# Patient Record
Sex: MALE | Race: BLACK OR AFRICAN AMERICAN | NOT HISPANIC OR LATINO | Employment: OTHER | URBAN - METROPOLITAN AREA
[De-identification: names, ages, dates, MRNs, and addresses within clinical notes are randomized per-mention and may not be internally consistent; named-entity substitution may affect disease eponyms.]

---

## 2024-03-13 PROBLEM — I25.10 ATHEROSCLEROSIS OF CORONARY ARTERY WITHOUT ANGINA PECTORIS: Status: ACTIVE | Noted: 2024-03-13

## 2024-03-13 PROBLEM — G45.9 TIA (TRANSIENT ISCHEMIC ATTACK): Status: ACTIVE | Noted: 2024-03-13

## 2024-03-13 PROBLEM — I71.20 ANEURYSM OF THORACIC AORTA (CMS-HCC): Status: ACTIVE | Noted: 2024-03-13

## 2024-03-13 PROBLEM — I10 HYPERTENSION: Status: ACTIVE | Noted: 2024-03-13

## 2024-03-13 PROBLEM — G47.33 OBSTRUCTIVE SLEEP APNEA: Status: ACTIVE | Noted: 2024-03-13

## 2024-03-13 PROBLEM — E11.9 DIABETES (MULTI): Status: ACTIVE | Noted: 2024-03-13

## 2024-03-13 PROBLEM — Z98.61 HISTORY OF PTCA: Status: ACTIVE | Noted: 2024-03-13

## 2024-03-13 PROBLEM — E78.5 HYPERLIPIDEMIA: Status: ACTIVE | Noted: 2024-03-13

## 2024-03-13 PROBLEM — Z88.8 ALLERGY TO IODINE: Status: ACTIVE | Noted: 2024-03-13

## 2024-03-13 RX ORDER — GLUCOSAMINE HCL 500 MG
1 TABLET ORAL DAILY
COMMUNITY

## 2024-03-13 RX ORDER — METOPROLOL TARTRATE 25 MG/1
1 TABLET, FILM COATED ORAL 2 TIMES DAILY
COMMUNITY
Start: 2022-03-31 | End: 2024-03-18 | Stop reason: SDUPTHER

## 2024-03-13 RX ORDER — TAMSULOSIN HYDROCHLORIDE 0.4 MG/1
1 CAPSULE ORAL NIGHTLY
COMMUNITY

## 2024-03-13 RX ORDER — ATORVASTATIN CALCIUM 40 MG/1
1 TABLET, FILM COATED ORAL NIGHTLY
COMMUNITY
Start: 2021-11-24

## 2024-03-13 RX ORDER — ASPIRIN 81 MG/1
81 TABLET ORAL DAILY
COMMUNITY

## 2024-03-13 RX ORDER — METFORMIN HYDROCHLORIDE 500 MG/1
2 TABLET ORAL 2 TIMES DAILY
COMMUNITY

## 2024-03-13 RX ORDER — CYCLOBENZAPRINE HCL 10 MG
10 TABLET ORAL DAILY
COMMUNITY

## 2024-03-13 RX ORDER — VALSARTAN AND HYDROCHLOROTHIAZIDE 80; 12.5 MG/1; MG/1
1 TABLET, FILM COATED ORAL DAILY
COMMUNITY

## 2024-03-13 RX ORDER — PANTOPRAZOLE SODIUM 40 MG/1
1 TABLET, DELAYED RELEASE ORAL DAILY
COMMUNITY

## 2024-03-13 RX ORDER — GABAPENTIN 100 MG/1
1 CAPSULE ORAL 2 TIMES DAILY
COMMUNITY

## 2024-03-16 DIAGNOSIS — I10 PRIMARY HYPERTENSION: ICD-10-CM

## 2024-03-18 RX ORDER — METOPROLOL TARTRATE 25 MG/1
25 TABLET, FILM COATED ORAL 2 TIMES DAILY
Qty: 180 TABLET | Refills: 3 | Status: SHIPPED | OUTPATIENT
Start: 2024-03-18

## 2024-06-28 DIAGNOSIS — I10 HYPERTENSION, UNSPECIFIED TYPE: ICD-10-CM

## 2024-07-01 RX ORDER — VALSARTAN AND HYDROCHLOROTHIAZIDE 80; 12.5 MG/1; MG/1
1 TABLET, FILM COATED ORAL DAILY
Qty: 90 TABLET | Refills: 3 | Status: SHIPPED | OUTPATIENT
Start: 2024-07-01 | End: 2025-07-01

## 2024-07-24 ENCOUNTER — TELEPHONE (OUTPATIENT)
Dept: CARDIOLOGY | Facility: CLINIC | Age: 79
End: 2024-07-24

## 2024-07-24 ENCOUNTER — APPOINTMENT (OUTPATIENT)
Dept: CARDIOLOGY | Facility: CLINIC | Age: 79
End: 2024-07-24
Payer: MEDICARE

## 2024-07-24 VITALS
WEIGHT: 226.2 LBS | HEIGHT: 75 IN | BODY MASS INDEX: 28.12 KG/M2 | DIASTOLIC BLOOD PRESSURE: 76 MMHG | SYSTOLIC BLOOD PRESSURE: 122 MMHG | HEART RATE: 76 BPM

## 2024-07-24 DIAGNOSIS — E11.69 TYPE 2 DIABETES MELLITUS WITH OTHER SPECIFIED COMPLICATION, WITHOUT LONG-TERM CURRENT USE OF INSULIN (MULTI): ICD-10-CM

## 2024-07-24 DIAGNOSIS — I71.20 THORACIC AORTIC ANEURYSM (TAA), UNSPECIFIED PART, UNSPECIFIED WHETHER RUPTURED (CMS-HCC): ICD-10-CM

## 2024-07-24 DIAGNOSIS — Z98.61 HISTORY OF PTCA: ICD-10-CM

## 2024-07-24 DIAGNOSIS — I25.10 ATHEROSCLEROSIS OF CORONARY ARTERY WITHOUT ANGINA PECTORIS, UNSPECIFIED VESSEL OR LESION TYPE, UNSPECIFIED WHETHER NATIVE OR TRANSPLANTED HEART: ICD-10-CM

## 2024-07-24 DIAGNOSIS — E78.2 MIXED HYPERLIPIDEMIA: ICD-10-CM

## 2024-07-24 DIAGNOSIS — Z88.8 ALLERGY TO IODINE: ICD-10-CM

## 2024-07-24 PROCEDURE — 3078F DIAST BP <80 MM HG: CPT | Performed by: INTERNAL MEDICINE

## 2024-07-24 PROCEDURE — 1159F MED LIST DOCD IN RCRD: CPT | Performed by: INTERNAL MEDICINE

## 2024-07-24 PROCEDURE — 1160F RVW MEDS BY RX/DR IN RCRD: CPT | Performed by: INTERNAL MEDICINE

## 2024-07-24 PROCEDURE — 99214 OFFICE O/P EST MOD 30 MIN: CPT | Performed by: INTERNAL MEDICINE

## 2024-07-24 PROCEDURE — 3074F SYST BP LT 130 MM HG: CPT | Performed by: INTERNAL MEDICINE

## 2024-07-24 NOTE — PROGRESS NOTES
"Subjective   Mahendra Handley is a 78 y.o. male       Chief Complaint    Annual Exam          78-year-old gentleman returns for annual follow-up he is doing well he denies any cardiovascular events, complaints or nitrate usage or hospitalizations.  He did not follow through with CT imaging of the ascending aorta.    He has a history of remote PCI with bare-metal stent in 2008, diabetes mellitus, hypertension. He had normal stress imaging in 2020.     He has known history of ascending aortic dilation/aneurysmal disease has been followed for years and has been stable last CTA of the ascending aorta was 2 year ago and remains stable at 4.7 cm.    Recommendations, obtain lipid panel, obtain CT ascending aorta, with appropriate premedication for contrast allergy, follow-up otherwise in 1 year           Review of Systems   All other systems reviewed and are negative.           Vitals:    07/24/24 1206   BP: 122/76   BP Location: Left arm   Patient Position: Sitting   Pulse: 76   Weight: 103 kg (226 lb 3.2 oz)   Height: 1.905 m (6' 3\")        Objective   Physical Exam  Constitutional:       Appearance: Normal appearance.   HENT:      Nose: Nose normal.   Neck:      Vascular: No carotid bruit.   Cardiovascular:      Rate and Rhythm: Normal rate.      Pulses: Normal pulses.      Heart sounds: Normal heart sounds.   Pulmonary:      Effort: Pulmonary effort is normal.   Abdominal:      General: Bowel sounds are normal.      Palpations: Abdomen is soft.   Musculoskeletal:         General: Normal range of motion.      Cervical back: Normal range of motion.      Right lower leg: No edema.      Left lower leg: No edema.   Skin:     General: Skin is warm and dry.   Neurological:      General: No focal deficit present.      Mental Status: He is alert.   Psychiatric:         Mood and Affect: Mood normal.         Behavior: Behavior normal.         Thought Content: Thought content normal.         Judgment: Judgment normal. "         Allergies  Iodinated contrast media     Current Medications    Current Outpatient Medications:     aspirin 81 mg EC tablet, Take 1 tablet (81 mg) by mouth once daily., Disp: , Rfl:     atorvastatin (Lipitor) 40 mg tablet, Take 1 tablet (40 mg) by mouth once daily at bedtime., Disp: , Rfl:     cyclobenzaprine (Flexeril) 10 mg tablet, Take 1 tablet (10 mg) by mouth once daily., Disp: , Rfl:     gabapentin (Neurontin) 100 mg capsule, Take 1 capsule (100 mg) by mouth 2 times a day., Disp: , Rfl:     metFORMIN (Glucophage) 500 mg tablet, Take 2 tablets (1,000 mg) by mouth 2 times a day., Disp: , Rfl:     metoprolol tartrate (Lopressor) 25 mg tablet, TAKE 1 TABLET BY MOUTH TWICE  DAILY, Disp: 180 tablet, Rfl: 3    pantoprazole (ProtoNix) 40 mg EC tablet, Take 1 tablet (40 mg) by mouth once daily., Disp: , Rfl:     tamsulosin (Flomax) 0.4 mg 24 hr capsule, Take 1 capsule (0.4 mg) by mouth once daily at bedtime., Disp: , Rfl:     ubidecarenone (coenzyme Q10) 100 mg tablet, Take 1 tablet by mouth once daily., Disp: , Rfl:     valsartan-hydrochlorothiazide (Diovan-HCT) 80-12.5 mg tablet, Take 1 tablet by mouth once daily., Disp: 90 tablet, Rfl: 3                     Assessment/Plan   1. Atherosclerosis of coronary artery without angina pectoris, unspecified vessel or lesion type, unspecified whether native or transplanted heart        2. History of PTCA        3. Thoracic aortic aneurysm (TAA), unspecified part, unspecified whether ruptured (CMS-HCC)        4. Type 2 diabetes mellitus with other specified complication, without long-term current use of insulin (Multi)        5. BMI 28.0-28.9,adult        6. Mixed hyperlipidemia                 Scribe Attestation  By signing my name below, I, Radha REYNA LPN  , Scribe   attest that this documentation has been prepared under the direction and in the presence of Reddy Macedo DO.     Provider Attestation - Scribe documentation    All medical record entries made by the  Scribe were at my direction and personally dictated by me. I have reviewed the chart and agree that the record accurately reflects my personal performance of the history, physical exam, discussion and plan.

## 2024-07-24 NOTE — PATIENT INSTRUCTIONS
Please bring all medicines, vitamins, and herbal supplements with you when you come to the office.    Prescriptions will not be filled unless you are compliant with your follow up appointments or have a follow up appointment scheduled as per instruction of your physician. Refills should be requested at the time of your visit.   BMI was above normal measurement. Current weight: 103 kg (226 lb 3.2 oz)  Weight change since last visit (-) denotes wt loss -8.8 lbs   Weight loss needed to achieve BMI 25: 26.6 Lbs  Weight loss needed to achieve BMI 30: -13.3 Lbs  Advised to Increase physical activity.

## 2024-07-24 NOTE — LETTER
"July 24, 2024     Edvin Salamanca DO  [address]  [facility] [address]    Patient: Mahendra Handley   YOB: 1945   Date of Visit: 7/24/2024       Dear Dr. Edvin Salamanca, :    Thank you for referring Mahendra Handley to me for evaluation. Below are my notes for this consultation.  If you have questions, please do not hesitate to call me. I look forward to following your patient along with you.       Sincerely,     Reddy Macedo DO      CC: No Recipients  ______________________________________________________________________________________    Subjective   Mahendra Handley is a 78 y.o. male       Chief Complaint    Annual Exam          78-year-old gentleman returns for annual follow-up he is doing well he denies any cardiovascular events, complaints or nitrate usage or hospitalizations.  He did not follow through with CT imaging of the ascending aorta.    He has a history of remote PCI with bare-metal stent in 2008, diabetes mellitus, hypertension. He had normal stress imaging in 2020.     He has known history of ascending aortic dilation/aneurysmal disease has been followed for years and has been stable last CTA of the ascending aorta was 2 year ago and remains stable at 4.7 cm.    Recommendations, obtain lipid panel, obtain CT ascending aorta, with appropriate premedication for contrast allergy, follow-up otherwise in 1 year           Review of Systems   All other systems reviewed and are negative.           Vitals:    07/24/24 1206   BP: 122/76   BP Location: Left arm   Patient Position: Sitting   Pulse: 76   Weight: 103 kg (226 lb 3.2 oz)   Height: 1.905 m (6' 3\")        Objective   Physical Exam  Constitutional:       Appearance: Normal appearance.   HENT:      Nose: Nose normal.   Neck:      Vascular: No carotid bruit.   Cardiovascular:      Rate and Rhythm: Normal rate.      Pulses: Normal pulses.      Heart sounds: Normal heart sounds.   Pulmonary:      Effort: Pulmonary effort is " normal.   Abdominal:      General: Bowel sounds are normal.      Palpations: Abdomen is soft.   Musculoskeletal:         General: Normal range of motion.      Cervical back: Normal range of motion.      Right lower leg: No edema.      Left lower leg: No edema.   Skin:     General: Skin is warm and dry.   Neurological:      General: No focal deficit present.      Mental Status: He is alert.   Psychiatric:         Mood and Affect: Mood normal.         Behavior: Behavior normal.         Thought Content: Thought content normal.         Judgment: Judgment normal.         Allergies  Iodinated contrast media     Current Medications    Current Outpatient Medications:   •  aspirin 81 mg EC tablet, Take 1 tablet (81 mg) by mouth once daily., Disp: , Rfl:   •  atorvastatin (Lipitor) 40 mg tablet, Take 1 tablet (40 mg) by mouth once daily at bedtime., Disp: , Rfl:   •  cyclobenzaprine (Flexeril) 10 mg tablet, Take 1 tablet (10 mg) by mouth once daily., Disp: , Rfl:   •  gabapentin (Neurontin) 100 mg capsule, Take 1 capsule (100 mg) by mouth 2 times a day., Disp: , Rfl:   •  metFORMIN (Glucophage) 500 mg tablet, Take 2 tablets (1,000 mg) by mouth 2 times a day., Disp: , Rfl:   •  metoprolol tartrate (Lopressor) 25 mg tablet, TAKE 1 TABLET BY MOUTH TWICE  DAILY, Disp: 180 tablet, Rfl: 3  •  pantoprazole (ProtoNix) 40 mg EC tablet, Take 1 tablet (40 mg) by mouth once daily., Disp: , Rfl:   •  tamsulosin (Flomax) 0.4 mg 24 hr capsule, Take 1 capsule (0.4 mg) by mouth once daily at bedtime., Disp: , Rfl:   •  ubidecarenone (coenzyme Q10) 100 mg tablet, Take 1 tablet by mouth once daily., Disp: , Rfl:   •  valsartan-hydrochlorothiazide (Diovan-HCT) 80-12.5 mg tablet, Take 1 tablet by mouth once daily., Disp: 90 tablet, Rfl: 3                     Assessment/Plan   1. Atherosclerosis of coronary artery without angina pectoris, unspecified vessel or lesion type, unspecified whether native or transplanted heart        2. History of PTCA         3. Thoracic aortic aneurysm (TAA), unspecified part, unspecified whether ruptured (CMS-HCC)        4. Type 2 diabetes mellitus with other specified complication, without long-term current use of insulin (Multi)        5. BMI 28.0-28.9,adult        6. Mixed hyperlipidemia                 Scribe Attestation  By signing my name below, IRadha LPN  , Scribe   attest that this documentation has been prepared under the direction and in the presence of Reddy Macedo DO.     Provider Attestation - Scribe documentation    All medical record entries made by the Scribe were at my direction and personally dictated by me. I have reviewed the chart and agree that the record accurately reflects my personal performance of the history, physical exam, discussion and plan.

## 2024-07-25 RX ORDER — PREDNISONE 20 MG/1
20 TABLET ORAL 3 TIMES DAILY
Qty: 6 TABLET | Refills: 0 | Status: SHIPPED | OUTPATIENT
Start: 2024-07-25 | End: 2024-07-27

## 2024-07-25 RX ORDER — FAMOTIDINE 40 MG/1
40 TABLET, FILM COATED ORAL DAILY
Qty: 2 TABLET | Refills: 0 | Status: SHIPPED | OUTPATIENT
Start: 2024-07-25 | End: 2024-07-27

## 2024-07-25 RX ORDER — DIPHENHYDRAMINE HCL 25 MG
25 CAPSULE ORAL 3 TIMES DAILY
Qty: 6 CAPSULE | Refills: 0 | Status: SHIPPED | OUTPATIENT
Start: 2024-07-25 | End: 2024-07-27

## 2024-07-29 LAB
NON-UH HIE ISTAT CREATININE LEVEL: 2.3 MG/DL (ref 0.6–1.3)
NON-UH HIE ISTAT GFR: 28.36

## 2024-07-30 ENCOUNTER — TELEPHONE (OUTPATIENT)
Dept: CARDIOLOGY | Facility: CLINIC | Age: 79
End: 2024-07-30
Payer: MEDICARE

## 2024-07-30 NOTE — TELEPHONE ENCOUNTER
Spoke with patient who verbalized understanding of ct results. Will call office with any new symptoms or any changes

## 2024-07-30 NOTE — TELEPHONE ENCOUNTER
----- Message from Reddy Macedo sent at 7/29/2024  5:37 PM EDT -----  Let patient know ascending aorta is unchanged from the past at approximately 4.7 cm; continue to follow conservatively

## 2024-08-18 DIAGNOSIS — E78.5 HYPERLIPIDEMIA, UNSPECIFIED HYPERLIPIDEMIA TYPE: ICD-10-CM

## 2024-08-19 RX ORDER — ATORVASTATIN CALCIUM 40 MG/1
40 TABLET, FILM COATED ORAL NIGHTLY
Qty: 90 TABLET | Refills: 3 | Status: SHIPPED | OUTPATIENT
Start: 2024-08-19 | End: 2025-08-19

## 2024-10-28 ENCOUNTER — TELEPHONE (OUTPATIENT)
Dept: GASTROENTEROLOGY | Facility: CLINIC | Age: 79
End: 2024-10-28
Payer: MEDICARE

## 2024-12-03 ENCOUNTER — TELEPHONE (OUTPATIENT)
Dept: GASTROENTEROLOGY | Facility: CLINIC | Age: 79
End: 2024-12-03
Payer: MEDICARE

## 2024-12-03 NOTE — TELEPHONE ENCOUNTER
[facility] called regarding referral, please call to schedule with Dr. ARTURO Smart.

## 2024-12-13 ENCOUNTER — TELEPHONE (OUTPATIENT)
Dept: GASTROENTEROLOGY | Facility: CLINIC | Age: 79
End: 2024-12-13
Payer: MEDICARE

## 2024-12-13 NOTE — TELEPHONE ENCOUNTER
Rosalinda from [facility] called to see if Mahendra had a appointment scheduled yet, which I did not see he has or had appointment.     I gave her QIANA Loza's direct line and she has the [facility] fax number for Irene.

## 2025-01-02 ENCOUNTER — DOCUMENTATION (OUTPATIENT)
Dept: GASTROENTEROLOGY | Facility: HOSPITAL | Age: 80
End: 2025-01-02
Payer: MEDICARE

## 2025-01-02 NOTE — PROGRESS NOTES
Dr. Saranya Smart's office received a referral for this patient from Dr. Hugo Laird for abnormal levels of other serum enzymes, acute pancreatitis without necrosis or infection, unspecified. Dr. Smart reviewed the referral on 12/29/2024. Per Dr. Smart, OK to schedule a clinic consultation.    Irene Loza was notified to call and schedule this patient. Contact Lanette Yip RN at [phone] for any questions.      See below for supporting clinical information from the referral sent by Dr. Laird's office and from medical records:    Diagnosis Code:  ·       Abnormal levels of other serum enzymes  ·       Acute pancreatitis without necrosis or infection, unspecified.     Elevated amylase and lipase  Has a past medical history of CAD, DM 2, GERD, sleep apea and HTN  Stage 3b chronic kidney disease  CT A/P 9/27/2024: no CT findings of pancreatitis.     Labs from 10/16/2024:  ·       Bilirubin, Total: 0.7  ·       Alkaline Phosphatase: 61  ·       AST: 30  ·       ALT: 23  ·       Lipase: 47  ·       Amylase: 138 H        [facility]  Outside Information  Results  CT abdomen pelvis wo IV contrast (Order [id number])     CT abdomen pelvis wo IV contrast  Order: [id number]  Impression        No CT findings of pancreatitis.     Hepatic steatosis.              ELECTRONICALLY SIGNED BY: Ha Smith DO  Narrative        EXAM: CT ABDOMEN WO IV CONTRAST        History: elevation of pancreatic enzymes     Technique: Multiple contiguous axial images were obtained of the abdomen and pelvis from the level of the lung bases through the iliac crests without contrast. Multiplanar reformats were obtained.     All CT scans at this facility use dose modulation, iterative reconstruction, and/or weight based dosing when appropriate to reduce radiation dose to as low as reasonably achievable.        Comparison: None available     Findings:     Lung bases are clear.     Lack of intravenous contrast precludes optimal  evaluation of the abdominal and pelvic viscera. Hypoattenuation of the liver. Postsurgical changes of cholecystectomy. The spleen, stomach, and adrenal glands appear within normal limits. Normal appearance of the pancreas other than a few tiny pancreatic calcifications that can be seen in the setting of sequela of chronic pancreatitis. No peripancreatic edema or fluid collection. No pancreatic duct dilation.     The unenhanced kidneys appear within normal limits. No renal calculi or hydronephrosis.     Abdominal aorta is nonaneurysmal. Mild atherosclerotic calcification of the abdominal aorta. No retroperitoneal or upper abdominal lymphadenopathy.     Visualized bowel appears within normal limits. No free fluid or free air.     No acute osseous abnormality. Degenerative changes of the spine.  Exam End: 09/27/24 12:33      Specimen Collected: 09/27/24 13:45 Last Resulted: 09/27/24 13:49  Received From: [facility]

## 2025-01-27 ENCOUNTER — APPOINTMENT (OUTPATIENT)
Dept: GASTROENTEROLOGY | Facility: CLINIC | Age: 80
End: 2025-01-27
Payer: MEDICARE

## 2025-01-27 VITALS
RESPIRATION RATE: 18 BRPM | DIASTOLIC BLOOD PRESSURE: 87 MMHG | WEIGHT: 226 LBS | BODY MASS INDEX: 28.1 KG/M2 | HEIGHT: 75 IN | OXYGEN SATURATION: 98 % | SYSTOLIC BLOOD PRESSURE: 147 MMHG | HEART RATE: 63 BPM

## 2025-01-27 DIAGNOSIS — K86.1 CHRONIC PANCREATITIS, UNSPECIFIED PANCREATITIS TYPE (MULTI): Primary | ICD-10-CM

## 2025-01-27 DIAGNOSIS — K86.89 PANCREATIC INSUFFICIENCY (HHS-HCC): ICD-10-CM

## 2025-01-27 DIAGNOSIS — Z88.8 ALLERGY TO IODINE: ICD-10-CM

## 2025-01-27 DIAGNOSIS — R74.8 ELEVATED AMYLASE: ICD-10-CM

## 2025-01-27 PROCEDURE — 3077F SYST BP >= 140 MM HG: CPT | Performed by: INTERNAL MEDICINE

## 2025-01-27 PROCEDURE — 3079F DIAST BP 80-89 MM HG: CPT | Performed by: INTERNAL MEDICINE

## 2025-01-27 PROCEDURE — 1159F MED LIST DOCD IN RCRD: CPT | Performed by: INTERNAL MEDICINE

## 2025-01-27 PROCEDURE — 99204 OFFICE O/P NEW MOD 45 MIN: CPT | Performed by: INTERNAL MEDICINE

## 2025-01-27 NOTE — PROGRESS NOTES
REASON FOR VISIT: Elevated amylase, chronic pancreatitis seen on recent CT scan, history of allergy to iodine contrast    HPI:  Mahendra Handley is a 79 y.o. male who presents for above issues.  He does not have any complaints, review of system negative. Past medical history of coronary artery disease, diabetes type 2 GERD sleep apnea hypertension.  Status post cholecystectomy 20 years ago.  Had mild elevation of amylase at 149 on October 2024.  Had workup including CT scan of the abdomen without on September 2024 showed: Suboptimal evaluation of the abdomen and pelvis viscera due to lack of contrast.  Hypoattenuation of the liver.  Postsurgical changes of cholecystectomy.  The spleen, stomach, adrenal gland appear within normal limit.  Normal appearance of the pancreas other than a few tiny pancreatic calcification that can be seen in the setting of sequela of chronic pancreatitis.  No peripancreatic edema fluid collection.  No pancreatic duct dilation.  He denies, smoking, drinking or using any drugs.  No personal or family history of GI cancer or pancreatic disease.  His labs reviewed    REVIEW OF SYSTEMS  Review of Systems   Constitutional: Negative.  Negative for activity change, appetite change, chills, fatigue, fever and unexpected weight change.   HENT: Negative.     Respiratory: Negative.     Cardiovascular: Negative.  Negative for chest pain and palpitations.   Gastrointestinal: Negative.  Negative for abdominal distention, abdominal pain, anal bleeding, blood in stool, constipation, diarrhea, nausea, rectal pain and vomiting.   Skin: Negative.  Negative for color change and rash.   Neurological: Negative.  Negative for dizziness, tremors, seizures, weakness and headaches.   Psychiatric/Behavioral: Negative.  Negative for confusion.       Allergies   Allergen Reactions    Iodinated Contrast Media Unknown       No past medical history on file.    Past Surgical History:   Procedure Laterality Date    OTHER  SURGICAL HISTORY  03/25/2022    Cholecystectomy    OTHER SURGICAL HISTORY  03/25/2022    Back surgery    OTHER SURGICAL HISTORY  03/25/2022    Cataract surgery    OTHER SURGICAL HISTORY  03/25/2022    Colonoscopy    OTHER SURGICAL HISTORY  03/25/2022    Knee surgery    OTHER SURGICAL HISTORY  03/25/2022    Achilles tendon surgery    OTHER SURGICAL HISTORY  03/25/2022    Percutaneous transluminal coronary angioplasty       Family History   Problem Relation Name Age of Onset    No Known Problems Mother      No Known Problems Father      No Known Problems Sister         Social History     Tobacco Use    Smoking status: Never    Smokeless tobacco: Never   Substance Use Topics    Alcohol use: Never       Current Outpatient Medications   Medication Sig Dispense Refill    aspirin 81 mg EC tablet Take 1 tablet (81 mg) by mouth once daily.      atorvastatin (Lipitor) 40 mg tablet Take 1 tablet (40 mg) by mouth once daily at bedtime. 90 tablet 3    cyclobenzaprine (Flexeril) 10 mg tablet Take 1 tablet (10 mg) by mouth once daily.      gabapentin (Neurontin) 100 mg capsule Take 1 capsule (100 mg) by mouth 2 times a day.      metFORMIN (Glucophage) 500 mg tablet Take 2 tablets (1,000 mg) by mouth 2 times a day.      metoprolol tartrate (Lopressor) 25 mg tablet TAKE 1 TABLET BY MOUTH TWICE  DAILY 180 tablet 3    tamsulosin (Flomax) 0.4 mg 24 hr capsule Take 1 capsule (0.4 mg) by mouth once daily at bedtime.      ubidecarenone (coenzyme Q10) 100 mg tablet Take 1 tablet by mouth once daily.      valsartan-hydrochlorothiazide (Diovan-HCT) 80-12.5 mg tablet Take 1 tablet by mouth once daily. 90 tablet 3    diphenhydrAMINE (BENADryl) 25 mg capsule Take 1 capsule (25 mg) by mouth 3 times a day for 6 doses. Take one tablet 3 times daily for 2 days with last dose AM of procedure 6 capsule 0    famotidine (Pepcid) 40 mg tablet Take 1 tablet (40 mg) by mouth once daily for 2 days. Take 1 tablet daily for 2 days with last dose AM of  "procedure 2 tablet 0    pantoprazole (ProtoNix) 40 mg EC tablet Take 1 tablet (40 mg) by mouth once daily. (Patient not taking: Reported on 1/27/2025)       No current facility-administered medications for this visit.       PHYSICAL EXAM:  /87   Pulse 63   Resp 18   Ht 1.905 m (6' 3\")   Wt 103 kg (226 lb)   SpO2 98%   BMI 28.25 kg/m²    General appearance: No acute distress, cooperative.  Eyes: Nonicteric, no redness or proptosis  Ears, nose, mouth, and throat: Moist mucous membranes, tongue normal  Neck: Supple, no lymphadenopathy or thyromegaly  Lungs: Clear to auscultation bilaterally  CV: Regular rate and rhythm, no murmur; no pitting edema in the lower extremities  Abd: soft, non-tender; non-distended; normal active bowel sounds; no  scars  Skin: No rashes or lesions; no liver stigmata  MSK: No deformities or joint edema/redness/tenderness  Neuro: Alert and oriented ×3, normal gait, non-focal no asterixis      No results found for: \"WBC\", \"HGB\", \"HCT\", \"MCV\", \"PLT\"    Lab Results   Component Value Date    ALT 21 01/27/2025    AST 23 01/27/2025    ALKPHOS 56 01/27/2025    BILITOT 0.7 01/27/2025     No results found for: \"INR\", \"PROTIME\"    No results found for: \"IRON\", \"TIBC\", \"FERRITIN\"     ASSESSMENT&PLAN:  Mild elevation of amylase,?  Chronic pancreatitis without pancreatic exocrine insufficiency.  Patient does not have any alarming symptoms.  Will repeat amylase and lipase, triglyceride level, IgG4 and alpha-1 antitrypsin  Will order stool study including stool pancreatic elastase and fat qualitative to rule out exocrine pancreatic insufficiency  Will order MRI MRCP of the pancreas with iodine allergy protocol  Will follow-up with the patient after the workup      Consultation requested by Dr. Edvin Salamanca DO.   My final recommendations will be communicated back to the requesting physician by way of shared Medical record or letter to requesting physician via fax.          Signature: Saranya " MD Bing    Date: 1/27/2025  Time: 10:43 AM

## 2025-01-30 LAB
A1AT SERPL-MCNC: 122 MG/DL (ref 83–199)
ALBUMIN SERPL-MCNC: 4.3 G/DL (ref 3.6–5.1)
ALP SERPL-CCNC: 56 U/L (ref 35–144)
ALT SERPL-CCNC: 21 U/L (ref 9–46)
AMYLASE SERPL-CCNC: 92 U/L (ref 21–101)
ANION GAP SERPL CALCULATED.4IONS-SCNC: 11 MMOL/L (CALC) (ref 7–17)
AST SERPL-CCNC: 23 U/L (ref 10–35)
BILIRUB SERPL-MCNC: 0.7 MG/DL (ref 0.2–1.2)
BUN SERPL-MCNC: 27 MG/DL (ref 7–25)
CALCIUM SERPL-MCNC: 9.7 MG/DL (ref 8.6–10.3)
CHLORIDE SERPL-SCNC: 105 MMOL/L (ref 98–110)
CO2 SERPL-SCNC: 23 MMOL/L (ref 20–32)
CREAT SERPL-MCNC: 1.81 MG/DL (ref 0.7–1.28)
EGFRCR SERPLBLD CKD-EPI 2021: 38 ML/MIN/1.73M2
GLUCOSE SERPL-MCNC: 125 MG/DL (ref 65–139)
IGG4 SER-MCNC: 14.1 MG/DL (ref 4–86)
LIPASE SERPL-CCNC: 54 U/L (ref 7–60)
POTASSIUM SERPL-SCNC: 4.3 MMOL/L (ref 3.5–5.3)
PROT SERPL-MCNC: 6.3 G/DL (ref 6.1–8.1)
SODIUM SERPL-SCNC: 139 MMOL/L (ref 135–146)
TRIGL SERPL-MCNC: 279 MG/DL

## 2025-02-10 DIAGNOSIS — Z88.8 ALLERGY TO IODINE COMPOUND: ICD-10-CM

## 2025-02-11 RX ORDER — PREDNISONE 50 MG/1
50 TABLET ORAL DAILY
Qty: 3 TABLET | Refills: 0 | Status: SHIPPED | OUTPATIENT
Start: 2025-02-11 | End: 2025-02-14

## 2025-02-14 ENCOUNTER — HOSPITAL ENCOUNTER (OUTPATIENT)
Dept: RADIOLOGY | Facility: HOSPITAL | Age: 80
Discharge: HOME | End: 2025-02-14
Payer: MEDICARE

## 2025-02-14 DIAGNOSIS — R74.8 ELEVATED AMYLASE: ICD-10-CM

## 2025-02-14 DIAGNOSIS — K86.1 CHRONIC PANCREATITIS, UNSPECIFIED PANCREATITIS TYPE (MULTI): ICD-10-CM

## 2025-02-14 DIAGNOSIS — K86.89 PANCREATIC INSUFFICIENCY (HHS-HCC): ICD-10-CM

## 2025-02-14 PROCEDURE — 74183 MRI ABD W/O CNTR FLWD CNTR: CPT

## 2025-02-14 PROCEDURE — A9575 INJ GADOTERATE MEGLUMI 0.1ML: HCPCS | Performed by: INTERNAL MEDICINE

## 2025-02-14 PROCEDURE — 2550000001 HC RX 255 CONTRASTS: Performed by: INTERNAL MEDICINE

## 2025-02-14 RX ORDER — GADOTERATE MEGLUMINE 376.9 MG/ML
0.2 INJECTION INTRAVENOUS
Status: COMPLETED | OUTPATIENT
Start: 2025-02-14 | End: 2025-02-14

## 2025-02-14 RX ADMIN — GADOTERATE MEGLUMINE 20 ML: 376.9 INJECTION INTRAVENOUS at 14:15

## 2025-07-24 ENCOUNTER — APPOINTMENT (OUTPATIENT)
Dept: CARDIOLOGY | Facility: CLINIC | Age: 80
End: 2025-07-24
Payer: MEDICARE

## 2025-07-24 VITALS
HEART RATE: 60 BPM | WEIGHT: 223.4 LBS | BODY MASS INDEX: 27.78 KG/M2 | SYSTOLIC BLOOD PRESSURE: 118 MMHG | DIASTOLIC BLOOD PRESSURE: 68 MMHG | HEIGHT: 75 IN

## 2025-07-24 DIAGNOSIS — Z98.61 HISTORY OF PTCA: ICD-10-CM

## 2025-07-24 DIAGNOSIS — I25.10 ATHEROSCLEROSIS OF CORONARY ARTERY WITHOUT ANGINA PECTORIS, UNSPECIFIED VESSEL OR LESION TYPE, UNSPECIFIED WHETHER NATIVE OR TRANSPLANTED HEART: ICD-10-CM

## 2025-07-24 DIAGNOSIS — E78.5 HYPERLIPIDEMIA, UNSPECIFIED HYPERLIPIDEMIA TYPE: ICD-10-CM

## 2025-07-24 DIAGNOSIS — E78.2 MIXED HYPERLIPIDEMIA: ICD-10-CM

## 2025-07-24 DIAGNOSIS — Z88.8 ALLERGY TO IODINE: ICD-10-CM

## 2025-07-24 DIAGNOSIS — E11.69 TYPE 2 DIABETES MELLITUS WITH OTHER SPECIFIED COMPLICATION, WITHOUT LONG-TERM CURRENT USE OF INSULIN: ICD-10-CM

## 2025-07-24 DIAGNOSIS — I10 PRIMARY HYPERTENSION: ICD-10-CM

## 2025-07-24 DIAGNOSIS — G47.33 OBSTRUCTIVE SLEEP APNEA: ICD-10-CM

## 2025-07-24 DIAGNOSIS — I10 HYPERTENSION, UNSPECIFIED TYPE: ICD-10-CM

## 2025-07-24 DIAGNOSIS — I71.20 THORACIC AORTIC ANEURYSM (TAA), UNSPECIFIED PART, UNSPECIFIED WHETHER RUPTURED: ICD-10-CM

## 2025-07-24 DIAGNOSIS — Z78.9 NEVER SMOKED TOBACCO: ICD-10-CM

## 2025-07-24 PROCEDURE — 1160F RVW MEDS BY RX/DR IN RCRD: CPT | Performed by: INTERNAL MEDICINE

## 2025-07-24 PROCEDURE — 99213 OFFICE O/P EST LOW 20 MIN: CPT | Performed by: INTERNAL MEDICINE

## 2025-07-24 PROCEDURE — 3074F SYST BP LT 130 MM HG: CPT | Performed by: INTERNAL MEDICINE

## 2025-07-24 PROCEDURE — 3078F DIAST BP <80 MM HG: CPT | Performed by: INTERNAL MEDICINE

## 2025-07-24 PROCEDURE — 1159F MED LIST DOCD IN RCRD: CPT | Performed by: INTERNAL MEDICINE

## 2025-07-24 PROCEDURE — 1036F TOBACCO NON-USER: CPT | Performed by: INTERNAL MEDICINE

## 2025-07-24 RX ORDER — METOPROLOL TARTRATE 25 MG/1
25 TABLET, FILM COATED ORAL 2 TIMES DAILY
Qty: 180 TABLET | Refills: 3 | Status: SHIPPED | OUTPATIENT
Start: 2025-07-24

## 2025-07-24 RX ORDER — ATORVASTATIN CALCIUM 40 MG/1
40 TABLET, FILM COATED ORAL NIGHTLY
Qty: 90 TABLET | Refills: 3 | Status: SHIPPED | OUTPATIENT
Start: 2025-07-24 | End: 2026-07-24

## 2025-07-24 RX ORDER — VALSARTAN AND HYDROCHLOROTHIAZIDE 80; 12.5 MG/1; MG/1
1 TABLET, FILM COATED ORAL DAILY
Qty: 90 TABLET | Refills: 3 | Status: SHIPPED | OUTPATIENT
Start: 2025-07-24 | End: 2026-07-24

## 2025-07-24 NOTE — LETTER
"July 24, 2025     Edvin Salamanca DO  [address]  [place] [place] [address]    Patient: Mahendra Handley   YOB: 1945   Date of Visit: 7/24/2025       Dear Dr. Edvin Salamanca DO:    Thank you for referring Mahendra Handley to me for evaluation. Below are my notes for this consultation.  If you have questions, please do not hesitate to call me. I look forward to following your patient along with you.       Sincerely,     Reddy Macedo DO      CC: No Recipients  ______________________________________________________________________________________    Chief Complaint   Patient presents with   • Annual Exam     1 year, coronary artery disease       Subjective   Mahendra Handley is a 79 y.o. male     79-year-old gentleman returns for annual cardiovascular follow-up, he is doing well, he denies any cardiovascular events, complaints or nitrate usage or hospitalizations.  He remains active doing yard work on a regular basis, staying fairly fit.    We continue to follow him for ASHD, hypertension, hyperlipidemia, and ascending aortic dilation.  CT scan of the aorta from 1 year ago reveals a stable ascending aorta at 4.7 cm and is unchanged from previous evaluations.    He has a history of remote PCI with bare-metal stent in 2008, diabetes mellitus, hypertension. He had normal stress imaging in 2020.     Recommendations: Continue current therapies, follow-up again in 1 year; will repeat CT of the ascending aorta and 2027 or 28.         Review of Systems   All other systems reviewed and are negative.           Vitals:    07/24/25 1413   BP: 118/68   BP Location: Left arm   Patient Position: Sitting   Pulse: 60   Weight: 101 kg (223 lb 6.4 oz)   Height: 1.905 m (6' 3\")        Objective   Physical Exam  Constitutional:       Appearance: Normal appearance.   HENT:      Nose: Nose normal.   Neck:      Vascular: No carotid bruit.     Cardiovascular:      Rate and Rhythm: Normal rate.      Pulses: Normal " pulses.      Heart sounds: Normal heart sounds.   Pulmonary:      Effort: Pulmonary effort is normal.   Abdominal:      General: Bowel sounds are normal.      Palpations: Abdomen is soft.     Musculoskeletal:         General: Normal range of motion.      Cervical back: Normal range of motion.      Right lower leg: No edema.      Left lower leg: No edema.     Skin:     General: Skin is warm and dry.     Neurological:      General: No focal deficit present.      Mental Status: He is alert.     Psychiatric:         Mood and Affect: Mood normal.         Behavior: Behavior normal.         Thought Content: Thought content normal.         Judgment: Judgment normal.         Allergies  Iodinated contrast media     Current Medications  Current Outpatient Medications   Medication Instructions   • aspirin 81 mg, Daily   • atorvastatin (LIPITOR) 40 mg, oral, Nightly   • cyclobenzaprine (FLEXERIL) 10 mg, Daily   • diphenhydrAMINE (BENADRYL ALLERGY) 50 mg, oral, Daily   • diphenhydrAMINE (BENADRYL) 25 mg, oral, 3 times daily, Take one tablet 3 times daily for 2 days with last dose AM of procedure   • famotidine (PEPCID) 40 mg, oral, Daily, Take 1 tablet daily for 2 days with last dose AM of procedure   • gabapentin (Neurontin) 100 mg capsule 1 capsule, 2 times daily   • metFORMIN (Glucophage) 500 mg tablet 2 tablets, 2 times daily   • metoprolol tartrate (LOPRESSOR) 25 mg, oral, 2 times daily   • pantoprazole (ProtoNix) 40 mg EC tablet 1 tablet, Daily   • tamsulosin (Flomax) 0.4 mg 24 hr capsule 1 capsule, Nightly   • ubidecarenone (coenzyme Q10) 100 mg tablet 1 tablet, Daily   • valsartan-hydrochlorothiazide (Diovan-HCT) 80-12.5 mg tablet 1 tablet, oral, Daily                        Assessment/Plan   1. Atherosclerosis of coronary artery without angina pectoris, unspecified vessel or lesion type, unspecified whether native or transplanted heart  Follow Up In Cardiology      2. Allergy to iodine        3. Thoracic aortic aneurysm  (TAA), unspecified part, unspecified whether ruptured        4. History of PTCA        5. Mixed hyperlipidemia        6. Primary hypertension        7. Type 2 diabetes mellitus with other specified complication, without long-term current use of insulin        8. Obstructive sleep apnea        9. BMI 27.0-27.9,adult        10. Never smoked tobacco                 Scribe Attestation  By signing my name below, I, Octavia SNELL RN   , Scribe   attest that this documentation has been prepared under the direction and in the presence of Reddy Macedo DO.     Provider Attestation - Scribe documentation    All medical record entries made by the Scribe were at my direction and personally dictated by me. I have reviewed the chart and agree that the record accurately reflects my personal performance of the history, physical exam, discussion and plan.

## 2025-07-24 NOTE — PROGRESS NOTES
"Chief Complaint   Patient presents with    Annual Exam     1 year, coronary artery disease       Subjective   Mahendra Handley is a 79 y.o. male     79-year-old gentleman returns for annual cardiovascular follow-up, he is doing well, he denies any cardiovascular events, complaints or nitrate usage or hospitalizations.  He remains active doing yard work on a regular basis, staying fairly fit.    We continue to follow him for ASHD, hypertension, hyperlipidemia, and ascending aortic dilation.  CT scan of the aorta from 1 year ago reveals a stable ascending aorta at 4.7 cm and is unchanged from previous evaluations.    He has a history of remote PCI with bare-metal stent in 2008, diabetes mellitus, hypertension. He had normal stress imaging in 2020.     Recommendations: Continue current therapies, follow-up again in 1 year; will repeat CT of the ascending aorta and 2027 or 28.         Review of Systems   All other systems reviewed and are negative.           Vitals:    07/24/25 1413   BP: 118/68   BP Location: Left arm   Patient Position: Sitting   Pulse: 60   Weight: 101 kg (223 lb 6.4 oz)   Height: 1.905 m (6' 3\")        Objective   Physical Exam  Constitutional:       Appearance: Normal appearance.   HENT:      Nose: Nose normal.   Neck:      Vascular: No carotid bruit.     Cardiovascular:      Rate and Rhythm: Normal rate.      Pulses: Normal pulses.      Heart sounds: Normal heart sounds.   Pulmonary:      Effort: Pulmonary effort is normal.   Abdominal:      General: Bowel sounds are normal.      Palpations: Abdomen is soft.     Musculoskeletal:         General: Normal range of motion.      Cervical back: Normal range of motion.      Right lower leg: No edema.      Left lower leg: No edema.     Skin:     General: Skin is warm and dry.     Neurological:      General: No focal deficit present.      Mental Status: He is alert.     Psychiatric:         Mood and Affect: Mood normal.         Behavior: Behavior normal.    "      Thought Content: Thought content normal.         Judgment: Judgment normal.         Allergies  Iodinated contrast media     Current Medications  Current Outpatient Medications   Medication Instructions    aspirin 81 mg, Daily    atorvastatin (LIPITOR) 40 mg, oral, Nightly    cyclobenzaprine (FLEXERIL) 10 mg, Daily    diphenhydrAMINE (BENADRYL ALLERGY) 50 mg, oral, Daily    diphenhydrAMINE (BENADRYL) 25 mg, oral, 3 times daily, Take one tablet 3 times daily for 2 days with last dose AM of procedure    famotidine (PEPCID) 40 mg, oral, Daily, Take 1 tablet daily for 2 days with last dose AM of procedure    gabapentin (Neurontin) 100 mg capsule 1 capsule, 2 times daily    metFORMIN (Glucophage) 500 mg tablet 2 tablets, 2 times daily    metoprolol tartrate (LOPRESSOR) 25 mg, oral, 2 times daily    pantoprazole (ProtoNix) 40 mg EC tablet 1 tablet, Daily    tamsulosin (Flomax) 0.4 mg 24 hr capsule 1 capsule, Nightly    ubidecarenone (coenzyme Q10) 100 mg tablet 1 tablet, Daily    valsartan-hydrochlorothiazide (Diovan-HCT) 80-12.5 mg tablet 1 tablet, oral, Daily                        Assessment/Plan   1. Atherosclerosis of coronary artery without angina pectoris, unspecified vessel or lesion type, unspecified whether native or transplanted heart  Follow Up In Cardiology      2. Allergy to iodine        3. Thoracic aortic aneurysm (TAA), unspecified part, unspecified whether ruptured        4. History of PTCA        5. Mixed hyperlipidemia        6. Primary hypertension        7. Type 2 diabetes mellitus with other specified complication, without long-term current use of insulin        8. Obstructive sleep apnea        9. BMI 27.0-27.9,adult        10. Never smoked tobacco                 Scribe Attestation  By signing my name below, Octavia MATUTE RN   , Scribe   attest that this documentation has been prepared under the direction and in the presence of Reddy Macedo DO.     Provider Attestation - Scribe  documentation    All medical record entries made by the Scribe were at my direction and personally dictated by me. I have reviewed the chart and agree that the record accurately reflects my personal performance of the history, physical exam, discussion and plan.

## 2025-07-24 NOTE — PATIENT INSTRUCTIONS
Please bring all medicines, vitamins, and herbal supplements with you when you come to the office.    Prescriptions will not be filled unless you are compliant with your follow up appointments or have a follow up appointment scheduled as per instruction of your physician. Refills should be requested at the time of your visit.     BMI was above normal measurement. Current weight: 101 kg (223 lb 6.4 oz)  Weight change since last visit (-) denotes wt loss -2.6 lbs   Weight loss needed to achieve BMI 25: 23.8 Lbs  Weight loss needed to achieve BMI 30: -16.1 Lbs  Provided instructions on dietary changes  Provided instructions on exercise.

## 2026-07-21 ENCOUNTER — APPOINTMENT (OUTPATIENT)
Dept: CARDIOLOGY | Facility: CLINIC | Age: 81
End: 2026-07-21
Payer: MEDICARE